# Patient Record
Sex: FEMALE | Race: WHITE | NOT HISPANIC OR LATINO | Employment: FULL TIME | URBAN - NONMETROPOLITAN AREA
[De-identification: names, ages, dates, MRNs, and addresses within clinical notes are randomized per-mention and may not be internally consistent; named-entity substitution may affect disease eponyms.]

---

## 2021-04-15 ENCOUNTER — OFFICE VISIT (OUTPATIENT)
Dept: INTERNAL MEDICINE | Facility: CLINIC | Age: 46
End: 2021-04-15

## 2021-04-15 VITALS
BODY MASS INDEX: 32.11 KG/M2 | DIASTOLIC BLOOD PRESSURE: 82 MMHG | OXYGEN SATURATION: 100 % | TEMPERATURE: 96 F | SYSTOLIC BLOOD PRESSURE: 122 MMHG | HEIGHT: 70 IN | HEART RATE: 74 BPM | WEIGHT: 224.3 LBS | RESPIRATION RATE: 18 BRPM

## 2021-04-15 DIAGNOSIS — L29.9 PRURITUS: ICD-10-CM

## 2021-04-15 DIAGNOSIS — R21 RASH: Primary | ICD-10-CM

## 2021-04-15 PROCEDURE — 96372 THER/PROPH/DIAG INJ SC/IM: CPT | Performed by: FAMILY MEDICINE

## 2021-04-15 PROCEDURE — 99204 OFFICE O/P NEW MOD 45 MIN: CPT | Performed by: FAMILY MEDICINE

## 2021-04-15 RX ORDER — HYDROXYZINE PAMOATE 25 MG/1
25 CAPSULE ORAL 3 TIMES DAILY PRN
Qty: 60 CAPSULE | Refills: 1 | Status: SHIPPED | OUTPATIENT
Start: 2021-04-15 | End: 2021-04-20

## 2021-04-15 RX ORDER — PREDNISONE 10 MG/1
TABLET ORAL
Qty: 30 TABLET | Refills: 0 | Status: SHIPPED | OUTPATIENT
Start: 2021-04-15 | End: 2021-04-22 | Stop reason: SDUPTHER

## 2021-04-15 RX ORDER — METHYLPREDNISOLONE SODIUM SUCCINATE 40 MG/ML
40 INJECTION, POWDER, LYOPHILIZED, FOR SOLUTION INTRAMUSCULAR; INTRAVENOUS ONCE
Status: COMPLETED | OUTPATIENT
Start: 2021-04-15 | End: 2021-04-15

## 2021-04-15 RX ADMIN — METHYLPREDNISOLONE SODIUM SUCCINATE 40 MG: 40 INJECTION, POWDER, LYOPHILIZED, FOR SOLUTION INTRAMUSCULAR; INTRAVENOUS at 11:21

## 2021-04-15 RX ADMIN — METHYLPREDNISOLONE SODIUM SUCCINATE 40 MG: 40 INJECTION, POWDER, LYOPHILIZED, FOR SOLUTION INTRAMUSCULAR; INTRAVENOUS at 11:20

## 2021-04-15 NOTE — PROGRESS NOTES
"        Subjective     Chief Complaint   Patient presents with   • Rash     Whole body.       History of Present Illness  Onset of rash two months or longer.    Started on hands.  Went to legs and then spread body wide.   Seen in [place] and was told it was scabies.   Took pymtherim  durbacum  Hydrocortisone.  Antihistamine   Nothing has worked  Its driving her \"insane\".    Itches.    No scrapings or lab work.     Patient's PMR from outside medical facility reviewed and noted.    Review of Systems     Otherwise complete ROS reviewed and negative except as mentioned in the HPI.    Past Medical History: History reviewed. No pertinent past medical history.  Past Surgical History:History reviewed. No pertinent surgical history.  Social History:  reports that she has been smoking cigarettes. She has a 5.00 pack-year smoking history. She has never used smokeless tobacco. She reports current alcohol use. She reports that she does not use drugs.  Drinks wine.    Family History: family history includes Diabetes in her maternal grandmother; No Known Problems in her father.       Allergies:  No Known Allergies  Medications:  Prior to Admission medications    Not on File       Objective     Vital Signs: /82 (BP Location: Left arm, Patient Position: Sitting, Cuff Size: Adult)   Pulse 74   Temp 96 °F (35.6 °C) (Skin)   Resp 18   Ht 177.8 cm (70\")   Wt 102 kg (224 lb 4.8 oz)   SpO2 100%   BMI 32.18 kg/m²   Physical Exam  Vitals and nursing note reviewed.   Constitutional:       Appearance: She is well-developed.      Comments: Moderate distress     HENT:      Head: Normocephalic and atraumatic.      Right Ear: External ear normal.      Left Ear: External ear normal.      Nose: Nose normal.      Mouth/Throat:      Mouth: Mucous membranes are moist.      Pharynx: Oropharynx is clear.   Eyes:      General: No scleral icterus.        Right eye: No discharge.         Left eye: No discharge.      Conjunctiva/sclera: " Conjunctivae normal.      Pupils: Pupils are equal, round, and reactive to light.   Neck:      Thyroid: No thyromegaly.      Vascular: No JVD.      Trachea: No tracheal deviation.   Cardiovascular:      Rate and Rhythm: Normal rate and regular rhythm.      Heart sounds: Normal heart sounds. No murmur heard.   No friction rub. No gallop.    Pulmonary:      Effort: Pulmonary effort is normal.      Breath sounds: Normal breath sounds.   Abdominal:      General: Bowel sounds are normal. There is no distension.      Palpations: Abdomen is soft.      Tenderness: There is no abdominal tenderness.   Musculoskeletal:         General: Normal range of motion.      Cervical back: Normal range of motion and neck supple.   Lymphadenopathy:      Cervical: No cervical adenopathy.   Skin:     General: Skin is warm and dry.      Capillary Refill: Capillary refill takes less than 2 seconds.      Comments: Patient has rashing over her torso arms thighs and lower extremities as well as hands bilaterally.  It is very pruritic.  It is not vasculitic appearing.  There are two new lesions on her leg that have the appearance of nummular eczema.  They are about two and half centimeters in diameter.  She has more lesions on her hands at the fifth metacarpal base extending into the webspace particularly left hand is greater than right.  The remainder of the proximal phalangeal skin is dry and cracking which is actually consistent with eczema.   Neurological:      Mental Status: She is alert and oriented to person, place, and time.      Cranial Nerves: No cranial nerve deficit.      Coordination: Coordination normal.   Psychiatric:         Behavior: Behavior normal.         Patient's Body mass index is 32.18 kg/m².     Results Reviewed:  No results found for: GLUCOSE, BUN, CREATININE, NA, K, CL, CO2, CALCIUM, ALT, AST, WBC, HCT, PLT, CHOL, TRIG, HDL, LDL, LDLHDL, HGBA1C      Assessment / Plan     Assessment/Plan:  1. Rash  - GEOFF  - CBC w AUTO  Differential  - Comprehensive metabolic panel  - C-reactive protein  - methylPREDNISolone sodium succinate (SOLU-Medrol) injection 40 mg  - methylPREDNISolone sodium succinate (SOLU-Medrol) injection 40 mg    2. Pruritus    - GEOFF  - CBC w AUTO Differential  - Comprehensive metabolic panel  - C-reactive protein  - methylPREDNISolone sodium succinate (SOLU-Medrol) injection 40 mg  - methylPREDNISolone sodium succinate (SOLU-Medrol) injection 40 mg    We may need to biopsy some of these lesions with her next visit.    Return for Follow-up on Thursday.. unless patient needs to be seen sooner or acute issues arise.    Code Status: <no information>     I have discussed the patient results/orders and and plan/recommendation with them at today's visit.      Amie Espinosa,    04/15/2021

## 2021-04-16 ENCOUNTER — TELEPHONE (OUTPATIENT)
Dept: INTERNAL MEDICINE | Facility: CLINIC | Age: 46
End: 2021-04-16

## 2021-04-16 LAB
ALBUMIN SERPL-MCNC: 4.3 G/DL (ref 3.8–4.8)
ALBUMIN/GLOB SERPL: 1.4 {RATIO} (ref 1.2–2.2)
ALP SERPL-CCNC: 72 IU/L (ref 39–117)
ALT SERPL-CCNC: 11 IU/L (ref 0–32)
ANA SER QL: NEGATIVE
AST SERPL-CCNC: 29 IU/L (ref 0–40)
BASOPHILS # BLD AUTO: 0.1 X10E3/UL (ref 0–0.2)
BASOPHILS NFR BLD AUTO: 1 %
BILIRUB SERPL-MCNC: 0.3 MG/DL (ref 0–1.2)
BUN SERPL-MCNC: 9 MG/DL (ref 6–24)
BUN/CREAT SERPL: 12 (ref 9–23)
CALCIUM SERPL-MCNC: 9.3 MG/DL (ref 8.7–10.2)
CHLORIDE SERPL-SCNC: 101 MMOL/L (ref 96–106)
CO2 SERPL-SCNC: 25 MMOL/L (ref 20–29)
CREAT SERPL-MCNC: 0.73 MG/DL (ref 0.57–1)
CRP SERPL-MCNC: 5 MG/L (ref 0–10)
EOSINOPHIL # BLD AUTO: 0.4 X10E3/UL (ref 0–0.4)
EOSINOPHIL NFR BLD AUTO: 5 %
ERYTHROCYTE [DISTWIDTH] IN BLOOD BY AUTOMATED COUNT: 12.4 % (ref 11.7–15.4)
GLOBULIN SER CALC-MCNC: 3.1 G/DL (ref 1.5–4.5)
GLUCOSE SERPL-MCNC: 96 MG/DL (ref 65–99)
HCT VFR BLD AUTO: 40.2 % (ref 34–46.6)
HGB BLD-MCNC: 13.6 G/DL (ref 11.1–15.9)
IMM GRANULOCYTES # BLD AUTO: 0 X10E3/UL (ref 0–0.1)
IMM GRANULOCYTES NFR BLD AUTO: 0 %
LYMPHOCYTES # BLD AUTO: 2.1 X10E3/UL (ref 0.7–3.1)
LYMPHOCYTES NFR BLD AUTO: 26 %
MCH RBC QN AUTO: 32.2 PG (ref 26.6–33)
MCHC RBC AUTO-ENTMCNC: 33.8 G/DL (ref 31.5–35.7)
MCV RBC AUTO: 95 FL (ref 79–97)
MONOCYTES # BLD AUTO: 0.7 X10E3/UL (ref 0.1–0.9)
MONOCYTES NFR BLD AUTO: 9 %
NEUTROPHILS # BLD AUTO: 4.9 X10E3/UL (ref 1.4–7)
NEUTROPHILS NFR BLD AUTO: 59 %
PLATELET # BLD AUTO: 311 X10E3/UL (ref 150–450)
POTASSIUM SERPL-SCNC: 3.8 MMOL/L (ref 3.5–5.2)
PROT SERPL-MCNC: 7.4 G/DL (ref 6–8.5)
RBC # BLD AUTO: 4.22 X10E6/UL (ref 3.77–5.28)
SODIUM SERPL-SCNC: 141 MMOL/L (ref 134–144)
WBC # BLD AUTO: 8.3 X10E3/UL (ref 3.4–10.8)

## 2021-04-16 NOTE — TELEPHONE ENCOUNTER
Caller: ROMI Brown    Relationship: Self    Best call back number: [phone]     What is the best time to reach you: ANYTIME    Who are you requesting to speak with (clinical staff, provider,  specific staff member): CLINICAL STAFF     Do you know the name of the person who called: ROMI BROWN     What was the call regarding: REQUESTING AN UPDATE ON TESTING     Do you require a callback: YES

## 2021-04-16 NOTE — TELEPHONE ENCOUNTER
Pt called in inquiring about her rapid covid test. Spoke with Kelly CEBALLOS who wanted me to speak with Faith Garcia practice manager about getting rapid covid test. We switched the pt apt to 0715 on Thursday morning so that the  can take the covid swab to [place] and get the patients results back for her in time.

## 2021-04-19 NOTE — TELEPHONE ENCOUNTER
Pt called back stating someone from our clinic called her.  I didn't see an encounter where anyone called.  PT mentioned the medication was working and hoping to get this rash resolved before she leaves.

## 2021-04-19 NOTE — TELEPHONE ENCOUNTER
Called pt and she is wondering if she can take a second prescription with her when she goes to [place] to have with her. States when she take Vistaril she's fine, but by the time another dose is due she can tell its wearing off and starts itching.

## 2021-04-20 RX ORDER — HYDROXYZINE PAMOATE 25 MG/1
25 CAPSULE ORAL 3 TIMES DAILY PRN
Qty: 60 CAPSULE | Refills: 3 | Status: SHIPPED | OUTPATIENT
Start: 2021-04-20

## 2021-04-20 RX ORDER — HYDROXYZINE PAMOATE 25 MG/1
CAPSULE ORAL
Qty: 60 CAPSULE | Refills: 3 | Status: SHIPPED | OUTPATIENT
Start: 2021-04-20 | End: 2021-04-20 | Stop reason: SDUPTHER

## 2021-04-22 ENCOUNTER — OFFICE VISIT (OUTPATIENT)
Dept: INTERNAL MEDICINE | Facility: CLINIC | Age: 46
End: 2021-04-22

## 2021-04-22 VITALS
TEMPERATURE: 97.8 F | OXYGEN SATURATION: 99 % | WEIGHT: 251.3 LBS | RESPIRATION RATE: 18 BRPM | BODY MASS INDEX: 35.98 KG/M2 | SYSTOLIC BLOOD PRESSURE: 122 MMHG | HEIGHT: 70 IN | HEART RATE: 77 BPM | DIASTOLIC BLOOD PRESSURE: 85 MMHG

## 2021-04-22 DIAGNOSIS — Z78.9 HISTORY OF FOREIGN TRAVEL: Primary | ICD-10-CM

## 2021-04-22 DIAGNOSIS — R21 RASH: ICD-10-CM

## 2021-04-22 DIAGNOSIS — L29.9 PRURITUS: ICD-10-CM

## 2021-04-22 LAB — SARS-COV-2 RNA PNL SPEC NAA+PROBE: NOT DETECTED

## 2021-04-22 PROCEDURE — 87635 SARS-COV-2 COVID-19 AMP PRB: CPT | Performed by: FAMILY MEDICINE

## 2021-04-22 PROCEDURE — 99213 OFFICE O/P EST LOW 20 MIN: CPT | Performed by: FAMILY MEDICINE

## 2021-04-22 RX ORDER — MONTELUKAST SODIUM 10 MG/1
10 TABLET ORAL NIGHTLY
Qty: 30 TABLET | Refills: 3 | Status: SHIPPED | OUTPATIENT
Start: 2021-04-22

## 2021-04-22 RX ORDER — PREDNISONE 10 MG/1
TABLET ORAL
Qty: 30 TABLET | Refills: 0 | Status: SHIPPED | OUTPATIENT
Start: 2021-04-22

## 2021-04-22 NOTE — PROGRESS NOTES
"        Subjective     Chief Complaint   Patient presents with   • Rash     Follow up.    • Anxiety       History of Present Illness  The rash has improved.  The hand lesions are healing.  There is still a cracks but they are better.  Torso still itches a fair amount but extremities are improved.   Vistaril does help with the itching.  It does not cause any sedation.  She is 20 mg daily on the prednisone.    Home this weekend.  Patient's PMR from outside medical facility reviewed and noted.    Review of Systems     Otherwise complete ROS reviewed and negative except as mentioned in the HPI.    Past Medical History: History reviewed. No pertinent past medical history.  Past Surgical History:History reviewed. No pertinent surgical history.  Social History:  reports that she has been smoking cigarettes. She has a 5.00 pack-year smoking history. She has never used smokeless tobacco. She reports current alcohol use. She reports that she does not use drugs.    Family History: family history includes Diabetes in her maternal grandmother; No Known Problems in her father.       Allergies:  No Known Allergies  Medications:  Prior to Admission medications    Medication Sig Start Date End Date Taking? Authorizing Provider   hydrOXYzine pamoate (VISTARIL) 25 MG capsule Take 1 capsule by mouth 3 (Three) Times a Day As Needed for Itching. 4/20/21   Amie Espinosa DO   predniSONE (DELTASONE) 10 MG tablet 4 daily x 3 then 3 daily x 3 then 2 daily x 3 then 1 daily x 3 then stop. 4/15/21   Amie Espinosa DO       Objective     Vital Signs: /85 (BP Location: Left arm, Patient Position: Sitting, Cuff Size: Large Adult)   Pulse 77   Temp 97.8 °F (36.6 °C) (Skin)   Resp 18   Ht 177.8 cm (70\")   Wt 114 kg (251 lb 4.8 oz)   SpO2 99%   BMI 36.06 kg/m²   Physical Exam  Vitals and nursing note reviewed.   Constitutional:       General: She is not in acute distress.     Appearance: Normal appearance. She is not " ill-appearing.   HENT:      Head: Normocephalic and atraumatic.      Right Ear: External ear normal.      Left Ear: External ear normal.   Eyes:      Extraocular Movements: Extraocular movements intact.      Pupils: Pupils are equal, round, and reactive to light.   Musculoskeletal:         General: Normal range of motion.   Skin:     General: Skin is warm and dry.      Capillary Refill: Capillary refill takes less than 2 seconds.      Comments: Erythema and excoriated area on her hands has improved.  Scattered pruritic lesions of torso have also improved.  She still shows evidence of scratching at these lesions.   Neurological:      General: No focal deficit present.      Mental Status: She is alert.   Psychiatric:         Mood and Affect: Mood normal.         Behavior: Behavior normal.       Patient's Body mass index is 36.06 kg/m².       Results Reviewed:  BUN   Date Value Ref Range Status   04/15/2021 9 6 - 24 mg/dL Final     Creatinine   Date Value Ref Range Status   04/15/2021 0.73 0.57 - 1.00 mg/dL Final     Sodium   Date Value Ref Range Status   04/15/2021 141 134 - 144 mmol/L Final     Potassium   Date Value Ref Range Status   04/15/2021 3.8 3.5 - 5.2 mmol/L Final     Chloride   Date Value Ref Range Status   04/15/2021 101 96 - 106 mmol/L Final     Total CO2   Date Value Ref Range Status   04/15/2021 25 20 - 29 mmol/L Final     Calcium   Date Value Ref Range Status   04/15/2021 9.3 8.7 - 10.2 mg/dL Final     ALT (SGPT)   Date Value Ref Range Status   04/15/2021 11 0 - 32 IU/L Final     AST (SGOT)   Date Value Ref Range Status   04/15/2021 29 0 - 40 IU/L Final     WBC   Date Value Ref Range Status   04/15/2021 8.3 3.4 - 10.8 x10E3/uL Final     Hematocrit   Date Value Ref Range Status   04/15/2021 40.2 34.0 - 46.6 % Final     Platelets   Date Value Ref Range Status   04/15/2021 311 150 - 450 x10E3/uL Final         Assessment / Plan     Assessment/Plan:  1. History of foreign travel    - COVID-19,[facility]  IN-HOUSE,Nasal Swab No Transport Media 3-4 HR TAT - Swab, Nasal Cavity    2. Rash  Do not scratch at the    3. Pruritus    Finish steroid taper dose.  Take with her in case she needs exacerbation  Refill Vistaril  Discussed using Pepcid twice a day this is over-the-counter.  Singulair 10 mg once daily.  Pink-white cream such as Cetaphil or CeraVe may be used on skin.  Avoid she products.  Wash using an allergy free detergent or direct.  Rinse clothes in vinegar.  Use Dove or similar for cleansing skin.          Return if symptoms worsen or fail to improve. unless patient needs to be seen sooner or acute issues arise.    I have discussed the patient results/orders and and plan/recommendation with them at today's visit.      Amie Espinosa DO   04/22/2021